# Patient Record
Sex: MALE | Race: NATIVE HAWAIIAN OR OTHER PACIFIC ISLANDER | NOT HISPANIC OR LATINO | Employment: STUDENT | URBAN - METROPOLITAN AREA
[De-identification: names, ages, dates, MRNs, and addresses within clinical notes are randomized per-mention and may not be internally consistent; named-entity substitution may affect disease eponyms.]

---

## 2020-08-08 ENCOUNTER — HOSPITAL ENCOUNTER (EMERGENCY)
Facility: HOSPITAL | Age: 20
Discharge: HOME | End: 2020-08-08
Attending: EMERGENCY MEDICINE
Payer: MEDICAID

## 2020-08-08 VITALS
HEART RATE: 61 BPM | TEMPERATURE: 98 F | SYSTOLIC BLOOD PRESSURE: 146 MMHG | OXYGEN SATURATION: 97 % | WEIGHT: 170 LBS | HEIGHT: 71 IN | DIASTOLIC BLOOD PRESSURE: 71 MMHG | BODY MASS INDEX: 23.8 KG/M2 | RESPIRATION RATE: 17 BRPM

## 2020-08-08 DIAGNOSIS — R82.81 PYURIA: ICD-10-CM

## 2020-08-08 DIAGNOSIS — A56.01 CHLAMYDIAL URETHRITIS IN MALE: Primary | ICD-10-CM

## 2020-08-08 LAB
BACTERIA #/AREA URNS AUTO: ABNORMAL /HPF
BILIRUB UR QL STRIP: NEGATIVE
CALCIUM CREATININE RATIO: 0.02
CALCIUM UR-MCNC: 5.4 MG/DL (ref 0–15)
CLARITY UR REFRACT.AUTO: CLEAR
COLOR UR AUTO: ABNORMAL
CREAT UR-MCNC: 305 MG/DL (ref 23–375)
GLUCOSE UR QL STRIP: NEGATIVE
HGB UR QL STRIP: NEGATIVE
KETONES UR QL STRIP: NEGATIVE
LEUKOCYTE ESTERASE UR QL STRIP: ABNORMAL
MICROSCOPIC COMMENT: ABNORMAL
NITRITE UR QL STRIP: NEGATIVE
NON-SQ EPI CELLS #/AREA URNS AUTO: 1 /HPF
PH UR STRIP: 5 [PH] (ref 5–8)
PROT UR QL STRIP: NEGATIVE
SP GR UR STRIP: 1.03 (ref 1–1.03)
SQUAMOUS #/AREA URNS AUTO: 0 /HPF
URN SPEC COLLECT METH UR: ABNORMAL
WBC #/AREA URNS AUTO: 10 /HPF (ref 0–5)

## 2020-08-08 PROCEDURE — 99284 EMERGENCY DEPT VISIT MOD MDM: CPT | Mod: ,,, | Performed by: EMERGENCY MEDICINE

## 2020-08-08 PROCEDURE — 87491 CHLMYD TRACH DNA AMP PROBE: CPT

## 2020-08-08 PROCEDURE — 81001 URINALYSIS AUTO W/SCOPE: CPT

## 2020-08-08 PROCEDURE — 63700000 PHARM REV CODE 250 ALT 637 W/O HCPCS: Performed by: EMERGENCY MEDICINE

## 2020-08-08 PROCEDURE — 99282 EMERGENCY DEPT VISIT SF MDM: CPT | Mod: 25

## 2020-08-08 PROCEDURE — 82570 ASSAY OF URINE CREATININE: CPT

## 2020-08-08 PROCEDURE — 82340 ASSAY OF CALCIUM IN URINE: CPT

## 2020-08-08 PROCEDURE — 99284 PR EMERGENCY DEPT VISIT,LEVEL IV: ICD-10-PCS | Mod: ,,, | Performed by: EMERGENCY MEDICINE

## 2020-08-08 RX ORDER — AZITHROMYCIN 250 MG/1
1000 TABLET, FILM COATED ORAL
Status: COMPLETED | OUTPATIENT
Start: 2020-08-08 | End: 2020-08-08

## 2020-08-08 RX ADMIN — AZITHROMYCIN 1000 MG: 250 TABLET, FILM COATED ORAL at 12:08

## 2020-08-08 NOTE — ED PROVIDER NOTES
Encounter Date: 8/8/2020       History     Chief Complaint   Patient presents with    Burning with Urination     burning with urination x 2 weeks      18 yo uncircumcised BM with about 10-12 day history of dysuria and some urgency without discharge or hematuria noted. No suprapubic or abdominal pain. No difficulty starting / stopping urination or perineal pain. No scrotal / testicular pain. Pain primarily at tip of glans in the meatus. Denies recent trauma. Does admit to unprotected sex several months ago however no symptoms until about 2 weeks ago. No irritation or lesions of foreskin. Denies recent self stimulation.  No flank pain. No recent worsening of chronic back pain issues. No cough, congestion, nausea, vomiting or diarrhea. No worsening of migraines. No change in appetite / activity.  States his mother told him he had a UTI in past however he does not remember having one so thinks it was a long time ago. Unknown if had urologic evaluation at time or if has any urinary tract abnormalities. No prior kidney stones. States mother gave him an OTC remedy (Uri-ease?)  for UTI several days ago and also had him apply bacitracin ointment to tip of glans / meatus several days ago , both of which did not change the dysuria.   PMH: Migraines, Chronic back pain.  No asthma, seizures. Possible prior UTI.   FH: No known renal calculi / urinary disorders.     The history is provided by the patient.     Review of patient's allergies indicates:  No Known Allergies  Past Medical History:   Diagnosis Date    ADHD (attention deficit hyperactivity disorder)     Asthma      History reviewed. No pertinent surgical history.  Family History   Problem Relation Age of Onset    Hypertension Neg Hx      Social History     Tobacco Use    Smoking status: Current Some Day Smoker   Substance Use Topics    Alcohol use: No    Drug use: No     Review of Systems   Constitutional: Negative for activity change, appetite change, chills,  diaphoresis, fatigue, fever and unexpected weight change.   HENT: Negative for congestion, dental problem, ear pain, facial swelling, mouth sores, nosebleeds, rhinorrhea, sore throat, trouble swallowing and voice change.    Eyes: Negative for photophobia, pain, discharge, redness, itching and visual disturbance.   Respiratory: Negative for cough, chest tightness, shortness of breath, wheezing and stridor.    Cardiovascular: Negative for chest pain and palpitations.   Gastrointestinal: Negative for abdominal distention, abdominal pain, constipation, diarrhea, nausea and vomiting.   Endocrine: Negative.    Genitourinary: Positive for dysuria, penile pain and urgency. Negative for decreased urine volume, difficulty urinating, enuresis, flank pain, frequency, genital sores, hematuria and testicular pain.   Musculoskeletal: Negative for arthralgias, back pain, gait problem, joint swelling, myalgias, neck pain and neck stiffness.   Skin: Negative for pallor and rash.   Allergic/Immunologic: Negative.    Neurological: Negative for dizziness, syncope, facial asymmetry, weakness, light-headedness, numbness and headaches.   Hematological: Negative for adenopathy. Does not bruise/bleed easily.   Psychiatric/Behavioral: Negative for agitation, confusion and sleep disturbance.   All other systems reviewed and are negative.      Physical Exam     Initial Vitals [08/08/20 1044]   BP Pulse Resp Temp SpO2   (!) 146/71 61 17 97.9 °F (36.6 °C) 97 %      MAP       --         Physical Exam    Nursing note and vitals reviewed.  Constitutional: Vital signs are normal. He appears well-developed and well-nourished. He is not diaphoretic. He is active and cooperative. He is easily aroused.  Non-toxic appearance. He does not appear ill. No distress.   HENT:   Head: Normocephalic and atraumatic. Head is without abrasion, without contusion, without right periorbital erythema and without left periorbital erythema.   Right Ear: Hearing, external  ear and ear canal normal. No drainage or swelling.   Left Ear: Hearing, external ear and ear canal normal. No drainage or swelling.   Nose: Nose normal. No mucosal edema or rhinorrhea. No epistaxis.   Mouth/Throat: Uvula is midline, oropharynx is clear and moist and mucous membranes are normal. Mucous membranes are not pale, not dry and not cyanotic. No oral lesions. No trismus in the jaw. Normal dentition. No uvula swelling. No posterior oropharyngeal edema or posterior oropharyngeal erythema.   Eyes: Conjunctivae, EOM and lids are normal. Pupils are equal, round, and reactive to light. Right eye exhibits no chemosis and no discharge. Left eye exhibits no chemosis and no discharge. Right conjunctiva is not injected. Right conjunctiva has no hemorrhage. Left conjunctiva is not injected. Left conjunctiva has no hemorrhage. No scleral icterus. Right eye exhibits normal extraocular motion. Left eye exhibits normal extraocular motion. Pupils are equal.   Neck: Trachea normal, normal range of motion, full passive range of motion without pain and phonation normal. Neck supple. No thyromegaly present. No stridor present. No spinous process tenderness and no muscular tenderness present. Normal range of motion present. No neck rigidity.   Cardiovascular: Normal rate, regular rhythm, S1 normal, S2 normal, normal heart sounds and intact distal pulses.  No extrasystoles are present.  Exam reveals no friction rub.    No murmur heard.  Pulses:       Femoral pulses are 2+ on the right side and 2+ on the left side.  Brisk capillary refill    Pulmonary/Chest: Effort normal and breath sounds normal. No accessory muscle usage or stridor. No tachypnea and no bradypnea. No respiratory distress. He has no decreased breath sounds. He has no wheezes. He has no rales. He exhibits no tenderness and no retraction.   Normal work of breathing    Abdominal: Soft. Normal appearance and bowel sounds are normal. He exhibits no distension and no  mass. There is no abdominal tenderness. There is no rigidity, no guarding and no CVA tenderness.   Genitourinary:    Testes normal.   Right testis shows no swelling and no tenderness. Left testis shows no swelling and no tenderness. Uncircumcised. Penile erythema present. No discharge found.         Musculoskeletal: Normal range of motion. No tenderness or edema.   Lymphadenopathy:        Head (right side): No submental, no submandibular and no tonsillar adenopathy present.        Head (left side): No submental, no submandibular and no tonsillar adenopathy present.     He has no cervical adenopathy.        Right cervical: No posterior cervical adenopathy present.       Left cervical: No posterior cervical adenopathy present. No inguinal adenopathy noted on the right or left side.   Neurological: He is alert, oriented to person, place, and time and easily aroused. He has normal strength. He is not disoriented. He displays no tremor. No cranial nerve deficit or sensory deficit. He exhibits normal muscle tone. Coordination and gait normal.   Skin: Skin is warm, dry and intact. Capillary refill takes less than 2 seconds. No abrasion, no bruising, no ecchymosis, no petechiae, no purpura and no rash noted. Rash is not urticarial. No cyanosis or erythema. No pallor. Nails show no clubbing.   Psychiatric: He has a normal mood and affect. His speech is normal and behavior is normal. Judgment and thought content normal. Cognition and memory are normal.         ED Course   Procedures  Labs Reviewed   C. TRACHOMATIS/N. GONORRHOEAE BY AMP DNA   URINALYSIS, REFLEX TO URINE CULTURE   CALCIUM/CREATININE RATIO,URINE RANDOM          Imaging Results    None          Medical Decision Making:   History:   Old Medical Records: I decided to obtain old medical records.  Old Records Summarized: records from clinic visits.       <> Summary of Records: Reviewed Clinic notes and prior ER visit notes in [facility]. Significant findings addressed in  HPI / PMH.    Initial Assessment:   Hemodynamically stable young adult male with reportedly remote episode of unprotected sex with recent onset of dysuria, primarily in distal penis, which may represent chlamydial urethritis although, with remote prior history, UTI may also be a consideration. As pain in distal penis, irritant etiology or passage of microcalculi are also potential etiologies.   Differential Diagnosis:   DDx includes: Dysuria- UTI, trauma, self stimulation, foreign body insertion,  STI (Chlamydia, GC),ureaplasma , evolving HSV,  evolving LGV, irritant / microcalculi (hypercalciura)   Clinical Tests:   Lab Tests: Ordered and Reviewed  The following lab test(s) were unremarkable: Urinalysis                                 Clinical Impression:       ICD-10-CM ICD-9-CM   1. Chlamydial urethritis in male  A56.01 099.41   2. Pyuria  R82.81 791.9                                Graeme Saldana III, MD  08/09/20 0713

## 2020-08-08 NOTE — DISCHARGE INSTRUCTIONS
Maintain increased fluid intake for the next 2-3 days    May give Tylenol / Motrin as needed for fever / discomfort.     The test for Sexually transmitted illnesses (ghonorrhea and Chlamydia) will have results in the next 1-2 days. You have already been treated during this visit for Chlamydia.  If you have other infections requiring treatment, you will be called to notify you of the need for additional treatment     The Urine culture result will be available in 2-3 days to ensure additional antibiotic treatment is not required. You will be called if there is a need to prescribe another antibiotic due to other bacterial urinary tract infection. Please ensure registration has a correct phone number in your file to allow you to be contacted if needed    Return to ER for persistent vomiting, breathing difficulty,  urinary symptoms not improving in 2-3 days, increased difficulty awakening [place] , visible blood / pus from penis , sores form around tip / shaft of penis , swollen painful lymph nodes in your groin , unusual behavior or new concerns / worsening symptoms

## 2020-08-08 NOTE — ED TRIAGE NOTES
Pt reports burning with urination x  1.5 weeks.  Denies penile discharge, blood in urine, flank pain, or n/v, fever.

## 2020-08-17 LAB
C TRACH DNA SPEC QL NAA+PROBE: NOT DETECTED
N GONORRHOEA DNA SPEC QL NAA+PROBE: DETECTED

## 2020-08-21 ENCOUNTER — HOSPITAL ENCOUNTER (EMERGENCY)
Facility: HOSPITAL | Age: 20
Discharge: HOME | End: 2020-08-21
Attending: EMERGENCY MEDICINE
Payer: MEDICAID

## 2020-08-21 VITALS
HEART RATE: 60 BPM | WEIGHT: 180 LBS | DIASTOLIC BLOOD PRESSURE: 75 MMHG | HEIGHT: 70 IN | OXYGEN SATURATION: 98 % | TEMPERATURE: 98 F | BODY MASS INDEX: 25.77 KG/M2 | RESPIRATION RATE: 16 BRPM | SYSTOLIC BLOOD PRESSURE: 117 MMHG

## 2020-08-21 DIAGNOSIS — A64 SEXUALLY TRANSMITTED INFECTION: Primary | ICD-10-CM

## 2020-08-21 PROCEDURE — 99284 EMERGENCY DEPT VISIT MOD MDM: CPT | Mod: 25

## 2020-08-21 PROCEDURE — 99284 PR EMERGENCY DEPT VISIT,LEVEL IV: ICD-10-PCS | Mod: ,,, | Performed by: EMERGENCY MEDICINE

## 2020-08-21 PROCEDURE — 63600175 PHARM REV CODE 636 W HCPCS: Performed by: EMERGENCY MEDICINE

## 2020-08-21 PROCEDURE — 25000003 PHARM REV CODE 250: Performed by: EMERGENCY MEDICINE

## 2020-08-21 PROCEDURE — 96372 THER/PROPH/DIAG INJ SC/IM: CPT

## 2020-08-21 PROCEDURE — 99284 EMERGENCY DEPT VISIT MOD MDM: CPT | Mod: ,,, | Performed by: EMERGENCY MEDICINE

## 2020-08-21 RX ORDER — CEFTRIAXONE 1 G/1
250 INJECTION, POWDER, FOR SOLUTION INTRAMUSCULAR; INTRAVENOUS ONCE
Status: COMPLETED | OUTPATIENT
Start: 2020-08-21 | End: 2020-08-21

## 2020-08-21 RX ORDER — LIDOCAINE HYDROCHLORIDE 10 MG/ML
1 INJECTION INFILTRATION; PERINEURAL ONCE
Status: COMPLETED | OUTPATIENT
Start: 2020-08-21 | End: 2020-08-21

## 2020-08-21 RX ADMIN — LIDOCAINE HYDROCHLORIDE 2.1 ML: 10 INJECTION, SOLUTION INFILTRATION; PERINEURAL at 10:08

## 2020-08-21 RX ADMIN — CEFTRIAXONE SODIUM 250 MG: 1 INJECTION, POWDER, FOR SOLUTION INTRAMUSCULAR; INTRAVENOUS at 10:08

## 2020-08-21 NOTE — ED NOTES
Patient identifiers verified and correct for Safia Tavera.    LOC: The patient is awake, alert and aware of environment with an appropriate affect, the patient is oriented x 3 and speaking appropriately.  APPEARANCE: Patient resting comfortably and in no acute distress, patient is clean and well groomed, patient's clothing is properly fastened.  SKIN: The skin is warm and dry, color consistent with ethnicity, patient has normal skin turgor and moist mucus membranes, skin intact, no breakdown or bruising noted.  MUSCULOSKELETAL: Patient moving all extremities spontaneously, no obvious swelling or deformities noted.  RESPIRATORY: Airway is open and patent, respirations are spontaneous, patient has a normal effort and rate, no accessory muscle use noted, bilateral breath sounds **.  CARDIAC: Patient has a normal rate and regular rhythm, no periphreal edema noted, capillary refill < 3 seconds.  ABDOMEN: Soft and non tender to palpation, no distention noted, normoactive bowel sounds present in all four quadrants.  NEUROLOGIC: PERRL, 3 mm bilaterally, eyes open spontaneously, behavior appropriate to situation, follows commands, facial expression symmetrical, bilateral hand grasp equal and even, purposeful motor response noted, normal sensation in all extremities when touched with a finger.  GI/: Denies issues however he is here for treatment of an STD.

## 2020-08-21 NOTE — DISCHARGE INSTRUCTIONS
No sexual intercourse for 1 week. Family aware to return for persistent fever, development of respiratory distress, change in mental status, decreased UOP, or any other acute medical issue requiring immediate attention.

## 2020-08-21 NOTE — ED PROVIDER NOTES
Encounter Date: 8/21/2020       History     Chief Complaint   Patient presents with    STD exposure     referred to ED for STD exposure      Safia is a 18 yo male here for call back for + STI screening. Patient was seen actually several weeks ago due to burning with urination and some discharge from this penis. No fever, no vomiting. No trouble urinating. Reports symptoms actually improved.         Review of patient's allergies indicates:  No Known Allergies  Past Medical History:   Diagnosis Date    ADHD (attention deficit hyperactivity disorder)     Asthma      History reviewed. No pertinent surgical history.  Family History   Problem Relation Age of Onset    Hypertension Neg Hx      Social History     Tobacco Use    Smoking status: Current Some Day Smoker    Smokeless tobacco: Never Used   Substance Use Topics    Alcohol use: No    Drug use: No     Review of Systems   Constitutional: Negative for activity change, appetite change, chills and fever.   HENT: Negative for congestion.    Respiratory: Negative for cough and shortness of breath.    Gastrointestinal: Negative for abdominal pain, nausea and vomiting.   Genitourinary: Positive for penile pain. Negative for decreased urine volume, difficulty urinating, discharge and dysuria.   Musculoskeletal: Negative for myalgias.   Skin: Negative for rash.       Physical Exam     Initial Vitals [08/21/20 1008]   BP Pulse Resp Temp SpO2   (!) 152/83 61 18 98.4 °F (36.9 °C) 98 %      MAP       --         Physical Exam    Vitals reviewed.  Constitutional: He appears well-developed and well-nourished. No distress.   HENT:   Head: Normocephalic.   Mouth/Throat: Oropharynx is clear and moist.   Eyes: Conjunctivae are normal.   Neck: Neck supple.   Cardiovascular: Normal rate, regular rhythm, normal heart sounds and intact distal pulses.   Pulmonary/Chest: Breath sounds normal. No respiratory distress.   Abdominal: Soft. He exhibits no distension. There is no  abdominal tenderness. There is no rebound and no guarding.   Genitourinary:    Penis normal.   No discharge found.   Musculoskeletal: Normal range of motion.   Neurological: He is alert.   Skin: Skin is warm and dry. Capillary refill takes less than 2 seconds. No rash noted.   Psychiatric: He has a normal mood and affect.         ED Course   Procedures  Labs Reviewed - No data to display       Imaging Results    None          Medical Decision Making:   History:   I obtained history from: someone other than patient and another health care provider.  Old Medical Records: I decided to obtain old medical records.  Initial Assessment:   Safia presents for emergent evaluation of + STI, will need treatment. He actually notes his symptoms have essentially resolved. Discussed with him still requires treatment. No further w/u indicated.   Differential Diagnosis:   STI  Clinical Tests:   Lab Tests: Reviewed  ED Management:  Pateint seen and examined, labs reviewed. Ceftriaxone given for treatment of gonorrhea, tolerated well. Clear RTER instructions reviewed.                                  Clinical Impression:       ICD-10-CM ICD-9-CM   1. Sexually transmitted infection  A64 099.9         Disposition:   Disposition: Discharged  Condition: Stable     ED Disposition Condition    Discharge Stable        ED Prescriptions     None        Follow-up Information     Follow up With Specialties Details Why Contact Info    Amelia Clarke MD Pediatrics In 2 days As needed NO LONGER PRACTICING  [place] [id number]  [phone]                                       Danay Edmond MD  08/21/20 0758

## 2020-08-23 ENCOUNTER — TELEPHONE (OUTPATIENT)
Dept: EMERGENCY MEDICINE | Facility: HOSPITAL | Age: 20
End: 2020-08-23

## 2020-08-23 DIAGNOSIS — A64 STI (SEXUALLY TRANSMITTED INFECTION): Primary | ICD-10-CM

## 2020-12-06 ENCOUNTER — HOSPITAL ENCOUNTER (EMERGENCY)
Facility: HOSPITAL | Age: 20
Discharge: HOME | End: 2020-12-06
Attending: EMERGENCY MEDICINE
Payer: MEDICAID

## 2020-12-06 VITALS
BODY MASS INDEX: 28.63 KG/M2 | SYSTOLIC BLOOD PRESSURE: 132 MMHG | HEIGHT: 70 IN | DIASTOLIC BLOOD PRESSURE: 79 MMHG | RESPIRATION RATE: 17 BRPM | TEMPERATURE: 98 F | HEART RATE: 55 BPM | OXYGEN SATURATION: 99 % | WEIGHT: 200 LBS

## 2020-12-06 DIAGNOSIS — K08.89 PAIN, DENTAL: Primary | ICD-10-CM

## 2020-12-06 PROCEDURE — 99283 EMERGENCY DEPT VISIT LOW MDM: CPT

## 2020-12-06 PROCEDURE — 25000003 PHARM REV CODE 250: Performed by: PHYSICIAN ASSISTANT

## 2020-12-06 RX ORDER — KETOROLAC TROMETHAMINE 10 MG/1
10 TABLET, FILM COATED ORAL
Status: COMPLETED | OUTPATIENT
Start: 2020-12-06 | End: 2020-12-06

## 2020-12-06 RX ORDER — KETOROLAC TROMETHAMINE 10 MG/1
10 TABLET, FILM COATED ORAL EVERY 6 HOURS
Qty: 20 TABLET | Refills: 0 | Status: SHIPPED | OUTPATIENT
Start: 2020-12-06 | End: 2020-12-11

## 2020-12-06 RX ADMIN — KETOROLAC TROMETHAMINE 10 MG: 10 TABLET, FILM COATED ORAL at 02:12

## 2020-12-06 NOTE — ED PROVIDER NOTES
Encounter Date: 12/6/2020       History     Chief Complaint   Patient presents with    Facial Pain     pt complasin of left sided facial pain x several mths, no deformity, no swelling noted      Safia Tavera, a 20 y.o. male  has a past medical history of ADHD (attention deficit hyperactivity disorder) and Asthma.     He presents to the ED evaluation of left sided facial pain that has been intermittent for 1 month.  Concern for pain at wisdom tooth.  Pain radiates to his left ear and he has sensitivity to cold and heat.  No facial swelling or fevers.  Has an appointment set this week for extraction of the tooth.  Treatments tried include ingestion of 800mg ibuprofen with no improvement of his symptoms.        The history is provided by the patient.     Review of patient's allergies indicates:  No Known Allergies  Past Medical History:   Diagnosis Date    ADHD (attention deficit hyperactivity disorder)     Asthma      No past surgical history on file.  Family History   Problem Relation Age of Onset    Hypertension Neg Hx      Social History     Tobacco Use    Smoking status: Current Some Day Smoker    Smokeless tobacco: Never Used   Substance Use Topics    Alcohol use: No    Drug use: No     Review of Systems   Constitutional: Negative for fever.   HENT: Positive for dental problem and ear pain. Negative for facial swelling, sore throat and tinnitus.    Skin: Negative for color change and wound.   Allergic/Immunologic: Negative for immunocompromised state.   All other systems reviewed and are negative.      Physical Exam     Initial Vitals [12/06/20 1400]   BP Pulse Resp Temp SpO2   132/79 (!) 55 17 98.1 °F (36.7 °C) 99 %      MAP       --         Physical Exam    Nursing note and vitals reviewed.  Constitutional: He appears well-developed and well-nourished.   HENT:   Head: Normocephalic and atraumatic.   Right Ear: Tympanic membrane, external ear and ear canal normal.   Left Ear: Tympanic membrane,  external ear and ear canal normal.   Nose: Nose normal.   Mouth/Throat: Oropharynx is clear and moist. No dental abscesses or dental caries.       Eyes: EOM are normal.   Neck: Normal range of motion. Neck supple.   Cardiovascular: Normal rate and regular rhythm.   Pulmonary/Chest: Breath sounds normal. No respiratory distress.   Abdominal: Soft.   Musculoskeletal: Normal range of motion. No tenderness or edema.   Lymphadenopathy:     He has no cervical adenopathy.   Neurological: He is alert and oriented to person, place, and time. No cranial nerve deficit.   Skin: Skin is warm and dry. Capillary refill takes less than 2 seconds.   Psychiatric: He has a normal mood and affect. Thought content normal.         ED Course   Procedures  Labs Reviewed - No data to display       Imaging Results    None          Medical Decision Making:   Initial Assessment:   Dental pain   Differential Diagnosis:   Dental jaz, dental abscess, pulpitis, gingivitis, fractured tooth     ED Management:  Patient presents to ED for concern of dental pain.  There is no identifiable drainable abscess.  No trismus.  No facial swelling.  Symptoms and exam concerning for wisdom tooth pain.  Patient will be prescribed a course of anti-inflammatories and antibiotics and highly encouraged to follow up with dentist.  Strict return precautions given and patient verbalized understanding and agreement with plan.                               Clinical Impression:       ICD-10-CM ICD-9-CM   1. Pain, dental  K08.89 525.9                          ED Disposition Condition    Discharge Stable        ED Prescriptions     Medication Sig Dispense Start Date End Date Auth. Provider    ketorolac (TORADOL) 10 mg tablet Take 1 tablet (10 mg total) by mouth every 6 (six) hours. for 5 days 20 tablet 12/6/2020 12/11/2020 Carol Pyle PA-C        Follow-up Information     Follow up With Specialties Details Why Contact Info        Please follow up with dentist as soon  as possible.  Return with new or worsening symptoms.                                       Carol Pyle PA-C  12/06/20 6167

## 2020-12-06 NOTE — ED NOTES
Patient identifiers for Safia Tavera checked and correct.    LOC: The patient is awake, alert and aware of environment with an appropriate affect, the patient is oriented x 3 and speaking appropriately.  APPEARANCE: Patient resting comfortably and in no acute distress, patient is clean and well groomed, patient's clothing are properly fastened.  SKIN: The skin is warm and dry, patient has age appropriate skin turgor and moist mucus membranes, skin intact, no breakdown or bruising noted.  MUSCULOSKELETAL: Patient moving all extremities well, no obvious swelling or deformities noted.  RESPIRATORY: Airway is open and patent, respirations are spontaneous, patient has a normal effort and rate, no accessory muscle use noted.  Clear breath sounds bilaterally.  CARDIAC: Patient has a normal rate and rhythm, no periphreal edema noted, capillary refill < 3 seconds.  ABDOMEN: Soft and non tender to palpation, no distention noted.  Normoactive bowel sounds x4.  NEUROLOGIC: PERRL, facial expression is symmetrical, bilateral hand grasp equal and even, normal sensation in all extremities when touched with a finger.

## 2021-12-07 ENCOUNTER — HOSPITAL ENCOUNTER (EMERGENCY)
Facility: HOSPITAL | Age: 21
Discharge: HOME | End: 2021-12-07
Attending: EMERGENCY MEDICINE
Payer: MEDICAID

## 2021-12-07 VITALS
HEART RATE: 73 BPM | OXYGEN SATURATION: 98 % | TEMPERATURE: 99 F | WEIGHT: 198 LBS | RESPIRATION RATE: 18 BRPM | BODY MASS INDEX: 28.41 KG/M2 | SYSTOLIC BLOOD PRESSURE: 143 MMHG | DIASTOLIC BLOOD PRESSURE: 76 MMHG

## 2021-12-07 DIAGNOSIS — Z71.89 EDUCATED ABOUT COVID-19 VIRUS INFECTION: ICD-10-CM

## 2021-12-07 DIAGNOSIS — Z20.822 ENCOUNTER FOR LABORATORY TESTING FOR COVID-19 VIRUS: Primary | ICD-10-CM

## 2021-12-07 LAB
CTP QC/QA: YES
SARS-COV-2 RDRP RESP QL NAA+PROBE: NEGATIVE

## 2021-12-07 PROCEDURE — 99281 EMR DPT VST MAYX REQ PHY/QHP: CPT | Mod: CS,,, | Performed by: EMERGENCY MEDICINE

## 2021-12-07 PROCEDURE — U0002 COVID-19 LAB TEST NON-CDC: HCPCS | Performed by: EMERGENCY MEDICINE

## 2021-12-07 PROCEDURE — 99282 EMERGENCY DEPT VISIT SF MDM: CPT | Mod: 25

## 2021-12-07 PROCEDURE — 99281 PR EMERGENCY DEPT VISIT,LEVEL I: ICD-10-PCS | Mod: CS,,, | Performed by: EMERGENCY MEDICINE

## 2022-02-22 ENCOUNTER — HOSPITAL ENCOUNTER (EMERGENCY)
Facility: HOSPITAL | Age: 22
Discharge: HOME | End: 2022-02-22
Attending: EMERGENCY MEDICINE
Payer: MEDICAID

## 2022-02-22 VITALS
HEIGHT: 70 IN | TEMPERATURE: 99 F | OXYGEN SATURATION: 100 % | DIASTOLIC BLOOD PRESSURE: 83 MMHG | SYSTOLIC BLOOD PRESSURE: 158 MMHG | BODY MASS INDEX: 28.35 KG/M2 | HEART RATE: 64 BPM | RESPIRATION RATE: 18 BRPM | WEIGHT: 198 LBS

## 2022-02-22 DIAGNOSIS — M79.641 RIGHT HAND PAIN: Primary | ICD-10-CM

## 2022-02-22 PROCEDURE — 99282 EMERGENCY DEPT VISIT SF MDM: CPT | Mod: 25

## 2022-02-22 PROCEDURE — 29125 APPL SHORT ARM SPLINT STATIC: CPT | Mod: RT

## 2022-02-22 PROCEDURE — 99282 EMERGENCY DEPT VISIT SF MDM: CPT | Mod: ,,, | Performed by: EMERGENCY MEDICINE

## 2022-02-22 PROCEDURE — 99282 PR EMERGENCY DEPT VISIT,LEVEL II: ICD-10-PCS | Mod: ,,, | Performed by: EMERGENCY MEDICINE

## 2022-02-22 NOTE — ED NOTES
Wrist splint applied, lengthy discussion regarding POC, Discharge home , states understanding to follow up as directed. Ambulates out of ED without difficulty.

## 2022-02-22 NOTE — ED PROVIDER NOTES
Source of History:  pt    Chief complaint:  Hand Pain (R hand pain denies injury)      HPI:  Safia Tavera is a 21 y.o. male presenting with right hand pain.  Patient notes 2 days of progressive pain in his right hand, worse over his thumb and wrist.  It is throbbing, worse with flexing or extending any of his digits.  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  He took 1 dose of 800 mg ibuprofen without any improvement.     ROS: As per HPI and below:    General: No fever.  No chills.  Eyes: No visual changes.  Head: No headache.    Integument: No rashes or lesions.  Chest: No shortness of breath.  Cardiovascular: No chest pain.  Abdomen: No abdominal pain.  No nausea or vomiting.  Urinary: No abnormal urination.  Neurologic: No focal weakness.  No numbness.  Hematologic: No easy bruising.  Endocrine: No excessive thirst or urination.      Review of patient's allergies indicates:  No Known Allergies    No current facility-administered medications on file prior to encounter.     Current Outpatient Medications on File Prior to Encounter   Medication Sig Dispense Refill    albuterol (PROVENTIL/VENTOLIN HFA) 90 mcg/actuation inhaler Inhale 2 puffs into the lungs every 6 (six) hours as needed.      dextroamphetamine-amphetamine 5 mg Tab Take by mouth.      ibuprofen (ADVIL,MOTRIN) 600 MG tablet Take 1 tablet (600 mg total) by mouth every 8 (eight) hours as needed for Pain. 12 tablet 0       PMH:  As per HPI and below:  Past Medical History:   Diagnosis Date    ADHD (attention deficit hyperactivity disorder)     Asthma      History reviewed. No pertinent surgical history.    Social History     Socioeconomic History    Marital status: Single   Tobacco Use    Smoking status: Current Some Day Smoker    Smokeless tobacco: Never Used   Substance and Sexual Activity    Alcohol use: No    Drug use: No    Sexual activity: Never       Family History   Problem Relation Age of Onset    Hypertension Neg Hx         Physical Exam:    Vitals:    02/22/22 0805   BP: (!) 158/83   Pulse: 64   Resp: 18   Temp: 99.1 °F (37.3 °C)     Appearance: No acute distress.  Skin: No rashes seen.  Good turgor.  No abrasions.  No ecchymoses.  Eyes: No conjunctival injection. EOMI, PERRL.  ENT: Oropharynx clear.    Chest:  No increased work of breathing, bilateral chest rise.  Cardiovascular: Regular rate and rhythm.  Normal equal bilateral radial pulses.  Abdomen: Soft.  Not distended.  Nontender.  No guarding.  No rebound. No Masses  Musculoskeletal:  Right hand is tender throughout metacarpals and thenar eminence, as well as over carpal tunnel.  Normal cap refill, normal sensation.  No swelling no deformity.  Neurologic: Moves all extremities.  Normal sensation.  No facial droop.  Normal speech.    Mental Status:  Alert and oriented x 3.  Appropriate, conversant.          Laboratory Studies:  Labs Reviewed - No data to display      Imaging Results    None         Medications Given:  Medications - No data to display    Discussed with: pt    MDM:    21 y.o. male with right hand pain without trauma.  Initial consideration for carpal tunnel syndrome, overuse injury, strain.  No sign of infection, no point tenderness to suggest fracture.  I suspect likely carpal tunnel syndrome or other musculoskeletal strain.  Recommended continuing NSAIDs, will place in brace.  Advised against heavy lifting or overuse involving the hand.  Advised pt to follow up with PCP or return if concerning symptoms arise. Pt understands and agrees with plan. Will d/c home.        Diagnostic Impression:    1. Right hand pain             Oneil Meza MD  02/22/22 5215

## 2022-02-22 NOTE — ED NOTES
Patient identifiers verified and correct for Mr Wright  C/C: Right hand pain SEE NN  APPEARANCE: awake and alert in NAD.  SKIN: warm, dry and intact. No breakdown or bruising.  MUSCULOSKELETAL: Patient moving all extremities spontaneously, no obvious swelling or deformities noted. Ambulates independently.  RESPIRATORY: Denies shortness of breath.Respirations unlabored.   CARDIAC: Denies CP, 2+ distal pulses; no peripheral edema  ABDOMEN: S/ND/NT, Denies nausea  : voids spontaneously, denies difficulty  Neurologic: AAO x 4; follows commands equal strength in all extremities; denies numbness/tingling. Denies dizziness reports gen wekanes

## 2022-02-22 NOTE — ED NOTES
Applied wrist splint, Discharge home, states understanding to follow up as directed. Ambulates out of ED without difficulty.

## 2022-02-22 NOTE — ED NOTES
Patient reports pain to right hand and wrist, unable to close completely, unsure of injury, pain onset 2 days ago. Ibuprofen today

## 2022-09-11 ENCOUNTER — HOSPITAL ENCOUNTER (EMERGENCY)
Facility: HOSPITAL | Age: 22
Discharge: HOME | End: 2022-09-11
Attending: EMERGENCY MEDICINE
Payer: MEDICAID

## 2022-09-11 VITALS
WEIGHT: 198 LBS | BODY MASS INDEX: 28.35 KG/M2 | TEMPERATURE: 100 F | HEIGHT: 70 IN | RESPIRATION RATE: 19 BRPM | SYSTOLIC BLOOD PRESSURE: 127 MMHG | HEART RATE: 98 BPM | OXYGEN SATURATION: 100 % | DIASTOLIC BLOOD PRESSURE: 72 MMHG

## 2022-09-11 DIAGNOSIS — J06.9 VIRAL URI WITH COUGH: ICD-10-CM

## 2022-09-11 DIAGNOSIS — K12.2 UVULITIS: Primary | ICD-10-CM

## 2022-09-11 LAB
GROUP A STREP, MOLECULAR: NEGATIVE
INFLUENZA A, MOLECULAR: NEGATIVE
INFLUENZA B, MOLECULAR: NEGATIVE
SPECIMEN SOURCE: NORMAL

## 2022-09-11 PROCEDURE — 25000003 PHARM REV CODE 250: Performed by: NURSE PRACTITIONER

## 2022-09-11 PROCEDURE — 99283 EMERGENCY DEPT VISIT LOW MDM: CPT | Mod: 25

## 2022-09-11 PROCEDURE — 87502 INFLUENZA DNA AMP PROBE: CPT | Performed by: NURSE PRACTITIONER

## 2022-09-11 PROCEDURE — 63600175 PHARM REV CODE 636 W HCPCS: Performed by: NURSE PRACTITIONER

## 2022-09-11 PROCEDURE — 87651 STREP A DNA AMP PROBE: CPT | Performed by: NURSE PRACTITIONER

## 2022-09-11 RX ORDER — PREDNISONE 20 MG/1
60 TABLET ORAL
Status: COMPLETED | OUTPATIENT
Start: 2022-09-11 | End: 2022-09-11

## 2022-09-11 RX ORDER — ACETAMINOPHEN 500 MG
1000 TABLET ORAL
Status: COMPLETED | OUTPATIENT
Start: 2022-09-11 | End: 2022-09-11

## 2022-09-11 RX ADMIN — PREDNISONE 60 MG: 20 TABLET ORAL at 05:09

## 2022-09-11 RX ADMIN — ACETAMINOPHEN 1000 MG: 500 TABLET ORAL at 06:09

## 2022-09-11 NOTE — ED NOTES
APPEARANCE: Awake, alert, & oriented. No acute distress. Endorses generalized bodyaches  CARDIAC: Normal rate and rhythm. Denies chest pain.     RESPIRATORY: Respirations are even and unlabored no obvious signs of distress. No accessory muscle use. Breath sounds clear bilaterally throughout chest.  PERIPHERAL VASCULAR: peripheral pulses present. Normal cap refill. No edema.   GASTRO: soft, no tenderness, no abdominal distention.  MUSC: Full ROM. No bony tenderness or soft tissue tenderness. No obvious deformity.  SKIN: Skin is warm, dry, and intact. Normal skin turgor and color.  NEURO: Equal strength bilaterally. Rolanda coma scale: Eye Response-4, Motor Response-6, Verbal Response-5. Total=15. Clear speech. No neurological abnormalities.   EENT: No c/o vision or hearing difficulties. Oropharynx irritated with enlarged uvula. Endorses sore throat

## 2022-09-11 NOTE — DISCHARGE INSTRUCTIONS
Any antihistamine over the counter (zyrtec, claritin, allegra, xyzal)- you do NOT have to buy the brand name, you can by the generic

## 2022-09-11 NOTE — Clinical Note
"Safia"Trace Tavera was seen and treated in our emergency department on 9/11/2022.  He may return to work on 09/14/2022.       If you have any questions or concerns, please don't hesitate to call.      Itzel Queen RN RN    "

## 2022-09-11 NOTE — ED PROVIDER NOTES
Encounter Date: 9/11/2022    SCRIBE #1 NOTE: I, Zuhair Lucero Jr, am scribing for, and in the presence of,  YON Salas. I have scribed the following portions of the note - Other sections scribed: HPI, ROS.     History     Chief Complaint   Patient presents with    Generalized Body Aches     Pt presents to ED today c/o body aches, cough, and subjective fever x 2 days sx unrelieved by nyquil. Pt reports negative home COVID test      Safia Tavera is a 22 y.o. male who has a past medical history of attention deficit hyperactivity disorder and asthma.The patient presents to the ED due to myalgias; onset two days. Associated symptoms include cough, fever, and rhinorrhea. Symptoms stated to have worsened since onset. The patient reports that once symptoms began to develop, he recorded his temperature; temperature was 101 degrees Fahrenheit. Patient took medication to combat symptoms; reports slight relief. Due to symptoms, the patient took a COVID-19 test; results were negative. He has no known allergies to medication.    The history is provided by the patient. No  was used.   Review of patient's allergies indicates:  No Known Allergies  Past Medical History:   Diagnosis Date    ADHD (attention deficit hyperactivity disorder)     Asthma      History reviewed. No pertinent surgical history.  Family History   Problem Relation Age of Onset    Hypertension Neg Hx      Social History     Tobacco Use    Smoking status: Some Days    Smokeless tobacco: Never   Substance Use Topics    Alcohol use: No    Drug use: No     Review of Systems   Constitutional:  Positive for fever.   HENT:  Positive for rhinorrhea and sore throat.    Respiratory:  Positive for cough. Negative for shortness of breath.    Cardiovascular:  Negative for chest pain.   Gastrointestinal:  Negative for nausea.   Genitourinary:  Negative for dysuria.   Musculoskeletal:  Positive for myalgias. Negative for back pain.   Skin:   Negative for rash.   Neurological:  Negative for weakness.   Hematological:  Does not bruise/bleed easily.   All other systems reviewed and are negative.    Physical Exam     Initial Vitals [09/11/22 1657]   BP Pulse Resp Temp SpO2   127/72 98 15 98.9 °F (37.2 °C) 96 %      MAP       --         Physical Exam    Nursing note and vitals reviewed.  Constitutional: He appears well-developed and well-nourished.   HENT:   Head: Normocephalic and atraumatic.   Right Ear: A middle ear effusion (clear) is present.   Left Ear: A middle ear effusion (clear) is present.   Elongated uvula without erythema; Swollen turbinates noted to bilateral nares and cobblestone appearance to posterior oropharynx       Eyes: Conjunctivae and EOM are normal. Pupils are equal, round, and reactive to light.   Neck:   Normal range of motion.  Cardiovascular:  Normal rate, regular rhythm, normal heart sounds and intact distal pulses.     Exam reveals no gallop and no friction rub.       No murmur heard.  Pulmonary/Chest: Breath sounds normal. No respiratory distress. He has no wheezes. He has no rhonchi. He has no rales. He exhibits no tenderness.   Abdominal: Abdomen is soft. Bowel sounds are normal. He exhibits no distension and no mass. There is no abdominal tenderness. There is no rebound and no guarding.   Musculoskeletal:         General: No tenderness or edema. Normal range of motion.      Cervical back: Normal range of motion.     Lymphadenopathy:     He has no cervical adenopathy.   Neurological: He is alert and oriented to person, place, and time. He has normal strength. GCS score is 15. GCS eye subscore is 4. GCS verbal subscore is 5. GCS motor subscore is 6.   Skin: Capillary refill takes less than 2 seconds.   Psychiatric: He has a normal mood and affect.       ED Course   Procedures  Labs Reviewed   INFLUENZA A & B BY MOLECULAR   GROUP A STREP, MOLECULAR                 Medications   predniSONE tablet 60 mg (60 mg Oral Given 9/11/22  1753)   acetaminophen tablet 1,000 mg (1,000 mg Oral Given 9/11/22 1830)     Medical Decision Making:   Initial Assessment:   21 y/o male which presents with viral uri symptoms. COVID negative at home. Influenza and strep pending.   Differential Diagnosis:   Influenza, viral uri, strep pharyngitis, uvulitis  Clinical Tests:   Lab Tests: Ordered and Reviewed       <> Summary of Lab: Influenza and strep negative  ED Management:  Pt examined and noted to have an elongated uvula consistent with a viral uri. Influenza and strep negative. Pt given a dose of steroids in the ED and has been advised to take antihistamines OTC upon discharge. Patient given strict return precautions and voiced understanding of all discharge instructions. Pt was stable at discharge.               Scribe Attestation:   Scribe #1: I performed the above scribed service and the documentation accurately describes the services I performed. I attest to the accuracy of the note.  This document was produced by a scribe under my direction and in my presence. I agree with the content of the note and have made any necessary edits.     Carol Good DNP, YON-BC        ED Course as of 09/12/22 1241   Sun Sep 11, 2022   1701 BP: 127/72 [AT]   1701 Temp: 98.9 °F (37.2 °C) [AT]   1701 Temp src: Oral [AT]   1701 Pulse: 98 [AT]   1701 Resp: 15 [AT]   1701 SpO2: 96 % [AT]   1809 Group A Strep, Molecular: Negative [AT]   1809 Influenza A, Molecular: Negative [AT]   1809 Influenza B, Molecular: Negative [AT]      ED Course User Index  [AT] YON Dubose             Clinical Impression:   Final diagnoses:  [K12.2] Uvulitis (Primary)  [J06.9] Viral URI with cough      ED Disposition Condition    Discharge Stable          ED Prescriptions    None       Follow-up Information       Follow up With Specialties Details Why Contact Info    Amelia Clarke MD Pediatrics Schedule an appointment as soon as possible for a visit  As needed NO LONGER  [place] [id number]  [phone]               Carol Good, [facility]  09/12/22 124

## 2022-11-13 ENCOUNTER — HOSPITAL ENCOUNTER (EMERGENCY)
Facility: HOSPITAL | Age: 22
Discharge: HOME | End: 2022-11-13
Attending: EMERGENCY MEDICINE
Payer: MEDICAID

## 2022-11-13 VITALS
DIASTOLIC BLOOD PRESSURE: 74 MMHG | WEIGHT: 191 LBS | TEMPERATURE: 98 F | SYSTOLIC BLOOD PRESSURE: 137 MMHG | HEART RATE: 60 BPM | RESPIRATION RATE: 16 BRPM | OXYGEN SATURATION: 98 % | BODY MASS INDEX: 27.41 KG/M2

## 2022-11-13 DIAGNOSIS — S93.402A SPRAIN OF LEFT ANKLE, UNSPECIFIED LIGAMENT, INITIAL ENCOUNTER: Primary | ICD-10-CM

## 2022-11-13 DIAGNOSIS — M25.572 LEFT ANKLE PAIN: ICD-10-CM

## 2022-11-13 PROCEDURE — 99283 EMERGENCY DEPT VISIT LOW MDM: CPT

## 2022-11-13 RX ORDER — IBUPROFEN 800 MG/1
800 TABLET ORAL EVERY 6 HOURS PRN
Qty: 20 TABLET | Refills: 0 | Status: SHIPPED | OUTPATIENT
Start: 2022-11-13

## 2022-11-13 NOTE — Clinical Note
"Safia Cruz"Liang was seen and treated in our emergency department on 11/13/2022.  He may return to work on 11/16/2022.       If you have any questions or concerns, please don't hesitate to call.       RN    "

## 2022-11-13 NOTE — ED PROVIDER NOTES
Encounter Date: 11/13/2022       History     Chief Complaint   Patient presents with    Ankle Pain     Pt has left ankle pain after tripping while coming down some steps around 2am. Pt able to ambulate with pain. No obvious deformity.      Patient is a 23 yo M who presents to the ED with the complaint of L ankle pain and swelling.   Pt states that he was descending some stairs when he rolled his L ankle.  He states that he was able to bear weight immediately after the aforementioned accident.  He now reports pain and swelling the lateral and medial aspects of his L ankle.  He denies any numbness or tingling.   He has not taken anything for his symptoms.     Review of patient's allergies indicates:  No Known Allergies  Past Medical History:   Diagnosis Date    ADHD (attention deficit hyperactivity disorder)     Asthma      No past surgical history on file.  Family History   Problem Relation Age of Onset    Hypertension Neg Hx      Social History     Tobacco Use    Smoking status: Some Days    Smokeless tobacco: Never   Substance Use Topics    Alcohol use: No    Drug use: No     Review of Systems   Constitutional:  Negative for chills and fever.   Musculoskeletal:  Positive for arthralgias and joint swelling. Negative for gait problem.   Skin:  Negative for color change and wound.     Physical Exam     Initial Vitals [11/13/22 1440]   BP Pulse Resp Temp SpO2   137/74 (!) 55 18 98.4 °F (36.9 °C) 99 %      MAP       --         Physical Exam    Constitutional: He appears well-developed and well-nourished.   Musculoskeletal:         General: Tenderness and edema present.      Left ankle: Swelling present. Tenderness present over the lateral malleolus and medial malleolus. Decreased range of motion. Anterior drawer test negative. Normal pulse.      Left Achilles Tendon: Normal.         ED Course   Procedures  Labs Reviewed - No data to display       Imaging Results              X-Ray Ankle Complete Left (Final result)   "Result time 11/13/22 15:17:54      Final result by Garry Sandhu MD (11/13/22 15:17:54)                   Impression:      Soft tissue swelling without evidence of a displaced fracture or dislocation.      Electronically signed by: Garry Sandhu MD  Date:    11/13/2022  Time:    15:17               Narrative:    EXAMINATION:  XR ANKLE COMPLETE 3 VIEW LEFT    CLINICAL HISTORY:  Pain in left ankle and joints of left foot    TECHNIQUE:  Three views of the left ankle were performed.    COMPARISON:  None    FINDINGS:  Osseous structures appear intact without evidence of osseous destructive process, fracture or dislocation.    No significant degenerative change.    Soft tissue swelling about the ankle, most pronounced laterally.                                       Medications - No data to display  Medical Decision Making:   Clinical Tests:   Radiological Study: Ordered and Reviewed  ED Management:  - plain radiograph of the left ankle demonstrates no acute osseous abnormality per my interpretation; this was confirmed by the final radiology read; will treat for presumptive ankle sprain with ACE wrap (applied in ED), so-called "RICE" therapy  - No further intervention is indicated at this time after having taken into account the patient's history, physical exam findings, and empirical and objective data obtained during the patient's emergency department workup.   - The patient is at low risk for an emergent medical condition at this time, and I am of the belief that that it is safe to discharge the patient from the emergency department.   - The patient is instructed to follow up as outpatient as indicated on the discharge paperwork.    - I have discussed the specifics of the workup with the patient and the patient has verbalized understanding of the details of the workup, the diagnosis, the treatment plan, and the need for outpatient follow-up.    - Although the patient has no emergent etiology today this does not " preclude the development of an emergent condition so, in addition, I have advised the patient that they can return to the ED and/or activate EMS at any time with worsening of their symptoms, change of their symptoms, or with any other medical complaint.    - The patient remained comfortable and stable during their visit in the ED.    - Discharge and follow-up instructions discussed with the patient who expressed understanding and willingness to comply with my recommendations.  - Results of all emergency department tests  discussed thoroughly with patient; all patient questions answered; pt in agreement with plan  - Pt instructed to follow up with PCP in 2-3 days for recheck of today's complaints  - Pt given strict emergency department return precautions for any new or worsening of symptoms  - Pt discharged from the emergency department in stable condition, in no acute distress                           Clinical Impression:   Final diagnoses:  [M25.572] Left ankle pain  [S93.402A] Sprain of left ankle, unspecified ligament, initial encounter (Primary)        ED Disposition Condition    Discharge Stable          ED Prescriptions       Medication Sig Dispense Start Date End Date Auth. Provider    ibuprofen (ADVIL,MOTRIN) 800 MG tablet Take 1 tablet (800 mg total) by mouth every 6 (six) hours as needed for Pain. 20 tablet 11/13/2022 -- Ron Mendieta MD          Follow-up Information       Follow up With Specialties Details Why Contact Info    Amelia Clarke MD Pediatrics   NO LONGER PRACTICING  [place] [id number] [phone]               Ron Mendieta MD  11/13/22 4107

## 2023-01-26 ENCOUNTER — HOSPITAL ENCOUNTER (EMERGENCY)
Facility: HOSPITAL | Age: 23
Discharge: HOME | End: 2023-01-26
Attending: EMERGENCY MEDICINE
Payer: MEDICAID

## 2023-01-26 VITALS
OXYGEN SATURATION: 100 % | WEIGHT: 193 LBS | HEART RATE: 79 BPM | RESPIRATION RATE: 18 BRPM | SYSTOLIC BLOOD PRESSURE: 134 MMHG | DIASTOLIC BLOOD PRESSURE: 68 MMHG | BODY MASS INDEX: 27.02 KG/M2 | TEMPERATURE: 99 F | HEIGHT: 71 IN

## 2023-01-26 DIAGNOSIS — Z71.1 CONCERN ABOUT STD IN MALE WITHOUT DIAGNOSIS: Primary | ICD-10-CM

## 2023-01-26 LAB
BILIRUB UR QL STRIP: NEGATIVE
CLARITY UR: CLEAR
COLOR UR: YELLOW
GLUCOSE UR QL STRIP: NEGATIVE
HGB UR QL STRIP: NEGATIVE
KETONES UR QL STRIP: NEGATIVE
LEUKOCYTE ESTERASE UR QL STRIP: NEGATIVE
NITRITE UR QL STRIP: NEGATIVE
PH UR STRIP: 7 [PH] (ref 5–8)
PROT UR QL STRIP: ABNORMAL
SP GR UR STRIP: 1.03 (ref 1–1.03)
URN SPEC COLLECT METH UR: ABNORMAL
UROBILINOGEN UR STRIP-ACNC: ABNORMAL EU/DL

## 2023-01-26 PROCEDURE — 25000003 PHARM REV CODE 250: Performed by: PHYSICIAN ASSISTANT

## 2023-01-26 PROCEDURE — 96372 THER/PROPH/DIAG INJ SC/IM: CPT | Performed by: PHYSICIAN ASSISTANT

## 2023-01-26 PROCEDURE — 87491 CHLMYD TRACH DNA AMP PROBE: CPT | Performed by: NURSE PRACTITIONER

## 2023-01-26 PROCEDURE — 99284 EMERGENCY DEPT VISIT MOD MDM: CPT

## 2023-01-26 PROCEDURE — 63600175 PHARM REV CODE 636 W HCPCS: Performed by: PHYSICIAN ASSISTANT

## 2023-01-26 PROCEDURE — 81003 URINALYSIS AUTO W/O SCOPE: CPT | Performed by: NURSE PRACTITIONER

## 2023-01-26 PROCEDURE — 87591 N.GONORRHOEAE DNA AMP PROB: CPT | Performed by: NURSE PRACTITIONER

## 2023-01-26 RX ORDER — DOXYCYCLINE 100 MG/1
100 CAPSULE ORAL 2 TIMES DAILY
Qty: 14 CAPSULE | Refills: 0 | Status: SHIPPED | OUTPATIENT
Start: 2023-01-26 | End: 2023-02-02

## 2023-01-26 RX ORDER — CEFTRIAXONE 500 MG/1
500 INJECTION, POWDER, FOR SOLUTION INTRAMUSCULAR; INTRAVENOUS
Status: COMPLETED | OUTPATIENT
Start: 2023-01-26 | End: 2023-01-26

## 2023-01-26 RX ORDER — DOXYCYCLINE HYCLATE 100 MG
100 TABLET ORAL
Status: COMPLETED | OUTPATIENT
Start: 2023-01-26 | End: 2023-01-26

## 2023-01-26 RX ADMIN — CEFTRIAXONE SODIUM 500 MG: 500 INJECTION, POWDER, FOR SOLUTION INTRAMUSCULAR; INTRAVENOUS at 07:01

## 2023-01-26 RX ADMIN — DOXYCYCLINE HYCLATE 100 MG: 100 TABLET, COATED ORAL at 07:01

## 2023-01-26 NOTE — FIRST PROVIDER EVALUATION
Emergency Department TeleTriage Encounter Note      CHIEF COMPLAINT    Chief Complaint   Patient presents with    Exposure to STD     Pts spouse was dx with BV. Pt denies any symptoms. Pt is requesting std testing and treatment.        VITAL SIGNS   Initial Vitals [01/26/23 1613]   BP Pulse Resp Temp SpO2   134/68 79 18 98.8 °F (37.1 °C) 100 %      MAP       --            ALLERGIES    Review of patient's allergies indicates:  No Known Allergies    PROVIDER TRIAGE NOTE  Denies discharge or pain.  No lumps bumps lesions or growths.        ORDERS  Labs Reviewed - No data to display    ED Orders (720h ago, onward)      Start Ordered     Status Ordering Provider    01/26/23 1632 01/26/23 1631  Urinalysis, Reflex to Urine Culture Urine, Clean Catch  STAT         Ordered FABIAN STRANGE    01/26/23 1632 01/26/23 1631  C. trachomatis/N. gonorrhoeae by AMP DNA [facility]; Urine  STAT         Ordered FABIAN STRANGE              Virtual Visit Note: The provider triage portion of this emergency department evaluation and documentation was performed via [employer], a HIPAA-compliant telemedicine application, in concert with a tele-presenter in the room. A face to face patient evaluation with one of my colleagues will occur once the patient is placed in an emergency department room.      DISCLAIMER: This note was prepared with [employer] voice recognition transcription software. Garbled syntax, mangled pronouns, and other bizarre constructions may be attributed to that software system.

## 2023-01-27 NOTE — ED TRIAGE NOTES
Patient identifiers verified and correct.     APPEARANCE: Patient not in acute distress.  NEURO: Awake, alert, appropriate for age, condition, and situation, pupils equal, round, and reactive.   HEENT: Head symmetrical. Eyes bilateral.  Bilateral ears without drainage. Bilateral nares patent, throat clear.  CARDIAC: Regular rate and rhythm  RESPIRATORY: Airway is open and patent. Respirations are normal and spontaneous on room air.    NEUROVASCULAR: All extremities are warm and pink. .  MUSCULOSKELETAL: Moves all extremities.   SKIN: Warm and dry, adequate turgor, mucus membranes moist and pink; no breakdown, lesions, or ecchymosis noted.     Will continue to monitor.

## 2023-01-27 NOTE — DISCHARGE INSTRUCTIONS

## 2023-01-27 NOTE — ED PROVIDER NOTES
Encounter Date: 1/26/2023       History     Chief Complaint   Patient presents with    Exposure to STD     Pts spouse was dx with BV. Pt denies any symptoms. Pt is requesting std testing and treatment.      22-year-old male presents to ED reporting his concern of exposure to BV.  Patient reporting his fiancee was diagnosed with BV roughly 1 week ago.  Denies penile pain or discharge, testicular pain or swelling, changes in urine color or frequency but does have intermittent dysuria.  No skin changes, rashes, ulcerations or pustules.  No joint swelling.  No other acute complaints at this time.    The history is provided by the patient.   Review of patient's allergies indicates:  No Known Allergies  Past Medical History:   Diagnosis Date    ADHD (attention deficit hyperactivity disorder)     Asthma      No past surgical history on file.  Family History   Problem Relation Age of Onset    Hypertension Neg Hx      Social History     Tobacco Use    Smoking status: Some Days    Smokeless tobacco: Never   Substance Use Topics    Alcohol use: No    Drug use: No     Review of Systems   Constitutional:  Negative for chills and fever.   Genitourinary:  Positive for dysuria. Negative for flank pain, hematuria, penile discharge, penile pain, penile swelling, scrotal swelling and testicular pain.   Musculoskeletal:  Negative for joint swelling.   Skin:  Negative for rash and wound.     Physical Exam     Initial Vitals [01/26/23 1613]   BP Pulse Resp Temp SpO2   134/68 79 18 98.8 °F (37.1 °C) 100 %      MAP       --         Physical Exam    Nursing note and vitals reviewed.  Constitutional: Vital signs are normal. He appears well-developed and well-nourished. He is cooperative. He does not have a sickly appearance. He does not appear ill. No distress.   HENT:   Head: Normocephalic and atraumatic.   Eyes: EOM are normal.   Neck:   Normal range of motion.  Genitourinary:    Genitourinary Comments:  exam deferred      Musculoskeletal:      Cervical back: Normal range of motion.     Neurological: He is alert and oriented to person, place, and time. GCS eye subscore is 4. GCS verbal subscore is 5. GCS motor subscore is 6.   Psychiatric: He has a normal mood and affect. His speech is normal and behavior is normal.       ED Course   Procedures  Labs Reviewed   URINALYSIS, REFLEX TO URINE CULTURE - Abnormal; Notable for the following components:       Result Value    Protein, UA Trace (*)     Urobilinogen, UA 2.0-3.0 (*)     All other components within normal limits    Narrative:     Specimen Source->Urine   C. TRACHOMATIS/N. GONORRHOEAE BY AMP DNA          Imaging Results    None          Medications   cefTRIAXone injection 500 mg (has no administration in time range)   doxycycline tablet 100 mg (has no administration in time range)     Medical Decision Making:   Initial Assessment:   Patient presents with concern of exposure to bacterial vaginosis.  He does report intermittent dysuria but denies penile discharge or any other complications.  Afebrile with vitals WNL.   exam deferred.  Patient otherwise well-appearing and in no distress.  Differential Diagnosis:   STD/STI, UTI, urethritis  ED Management:  Patient was educated he did not contract BV from his fiancee.  He does wish to proceed with STD/STI testing and has also agreed to prophylactic treatment.  Given IM Rocephin and doxycycline in ED along with prescription for doxycycline.  Urine otherwise appearing unremarkable with no evidence of urinary infection.  Vitals WNL.  Stable for discharge.  Will plan to contact patient for any positive G/C tests findings.  Encouraged to withhold from sexual activity until medically cleared.  ED return precautions were discussed.  Patient states his understanding and agrees with plan.                        Clinical Impression:   Final diagnoses:  [Z71.1] Concern about STD in male without diagnosis (Primary)               Randolph  JAISON Syed  01/26/23 1921

## 2023-01-28 LAB
C TRACH DNA SPEC QL NAA+PROBE: NOT DETECTED
N GONORRHOEA DNA SPEC QL NAA+PROBE: NOT DETECTED

## 2023-06-04 ENCOUNTER — HOSPITAL ENCOUNTER (EMERGENCY)
Facility: HOSPITAL | Age: 23
Discharge: HOME | End: 2023-06-05
Attending: EMERGENCY MEDICINE
Payer: MEDICAID

## 2023-06-04 DIAGNOSIS — Z13.9 ENCOUNTER FOR MEDICAL SCREENING EXAMINATION: Primary | ICD-10-CM

## 2023-06-04 PROCEDURE — 99283 EMERGENCY DEPT VISIT LOW MDM: CPT

## 2023-06-04 NOTE — Clinical Note
"Safia"Trace Tavera was seen and treated in our emergency department on 6/4/2023.  He may return to work on 06/05/2023.       If you have any questions or concerns, please don't hesitate to call.      Mauricio Mora MD"

## 2023-06-05 VITALS
HEART RATE: 54 BPM | WEIGHT: 200 LBS | SYSTOLIC BLOOD PRESSURE: 126 MMHG | OXYGEN SATURATION: 99 % | RESPIRATION RATE: 18 BRPM | TEMPERATURE: 99 F | HEIGHT: 70 IN | DIASTOLIC BLOOD PRESSURE: 76 MMHG | BODY MASS INDEX: 28.63 KG/M2
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
## 2023-06-05 NOTE — DISCHARGE INSTRUCTIONS
You are cleared to go back to work.    Thank you for choosing [facility]!     Our goal in the Emergency Department is to always provide outstanding medical care. You may receive a survey by mail or e-mail in the next week regarding your experience today. We would greatly appreciate you completing and returning the survey. Your feedback provides us with a way to recognize our staff who provide very good care, and it helps us learn how to improve when your experience was below our aspiration of excellence.      It is important to remember that some problems are difficult to diagnose and may not be found during your first visit. Be sure to follow up with your primary care doctor and review any labs/imaging that was performed during your visit with them. If you do not have a primary care doctor, you may contact the one listed on your discharge paperwork, or you may also call the [facility] Appointment Desk at [phone] to schedule an appointment.     All medications may potentially have side effects and it is impossible to predict which medications may give you side effects. If you feel that you are having a negative effect of any medication you should immediately stop taking them and seek medical attention.  Do not drive or make any important decisions for 24 hours if you have received any pain medications, sedatives or mood altering drugs during your ER visit.    We appreciate you trusting us with your medical care. We will be happy to take care of you for all of your future medical needs. You may return to the ER at any time for any new/concerning symptoms, worsening condition, or failure to improve. We hope you feel better soon.     Sincerely,    Mauricio Mora Jr., MD  Board-Certified Emergency Medicine Physician  [facility]
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
## 2023-06-20 ENCOUNTER — PATIENT MESSAGE (OUTPATIENT)
Dept: RESEARCH | Facility: HOSPITAL | Age: 23
End: 2023-06-20
Payer: MEDICAID

## 2023-06-27 ENCOUNTER — PATIENT MESSAGE (OUTPATIENT)
Dept: RESEARCH | Facility: HOSPITAL | Age: 23
End: 2023-06-27
Payer: MEDICAID

## 2023-07-05 ENCOUNTER — PATIENT MESSAGE (OUTPATIENT)
Dept: RESEARCH | Facility: HOSPITAL | Age: 23
End: 2023-07-05
Payer: MEDICAID

## 2024-07-10 ENCOUNTER — OCCUPATIONAL HEALTH (OUTPATIENT)
Dept: URGENT CARE | Facility: CLINIC | Age: 24
End: 2024-07-10

## 2024-07-10 DIAGNOSIS — Z02.1 PRE-EMPLOYMENT EXAMINATION: Primary | ICD-10-CM

## 2025-03-06 DIAGNOSIS — R51.9 HEADACHE, UNSPECIFIED: Primary | ICD-10-CM
